# Patient Record
Sex: MALE | Race: WHITE | NOT HISPANIC OR LATINO | Employment: FULL TIME | URBAN - METROPOLITAN AREA
[De-identification: names, ages, dates, MRNs, and addresses within clinical notes are randomized per-mention and may not be internally consistent; named-entity substitution may affect disease eponyms.]

---

## 2018-10-10 PROBLEM — S91.359A: Status: ACTIVE | Noted: 2018-10-10

## 2020-06-30 ENCOUNTER — HOSPITAL ENCOUNTER (EMERGENCY)
Facility: HOSPITAL | Age: 30
Discharge: HOME | End: 2020-06-30
Attending: EMERGENCY MEDICINE
Payer: COMMERCIAL

## 2020-06-30 VITALS
WEIGHT: 165 LBS | RESPIRATION RATE: 18 BRPM | DIASTOLIC BLOOD PRESSURE: 91 MMHG | HEIGHT: 70 IN | BODY MASS INDEX: 23.62 KG/M2 | HEART RATE: 89 BPM | SYSTOLIC BLOOD PRESSURE: 130 MMHG | OXYGEN SATURATION: 99 % | TEMPERATURE: 98 F

## 2020-06-30 DIAGNOSIS — S41.119A LACERATION OF ARM: ICD-10-CM

## 2020-06-30 DIAGNOSIS — T14.90XA INJURY: ICD-10-CM

## 2020-06-30 DIAGNOSIS — S51.812A LACERATION OF LEFT FOREARM, INITIAL ENCOUNTER: Primary | ICD-10-CM

## 2020-06-30 PROCEDURE — 99284 EMERGENCY DEPT VISIT MOD MDM: CPT | Mod: 25

## 2020-06-30 PROCEDURE — 12002 RPR S/N/AX/GEN/TRNK2.6-7.5CM: CPT

## 2020-06-30 PROCEDURE — 25000003 PHARM REV CODE 250: Performed by: EMERGENCY MEDICINE

## 2020-06-30 RX ORDER — HYDROCODONE BITARTRATE AND ACETAMINOPHEN 5; 325 MG/1; MG/1
1 TABLET ORAL EVERY 6 HOURS PRN
Qty: 6 TABLET | Refills: 0 | Status: SHIPPED | OUTPATIENT
Start: 2020-06-30 | End: 2023-11-29

## 2020-06-30 RX ORDER — CLINDAMYCIN HYDROCHLORIDE 150 MG/1
300 CAPSULE ORAL 4 TIMES DAILY
Qty: 40 CAPSULE | Refills: 0 | Status: SHIPPED | OUTPATIENT
Start: 2020-06-30 | End: 2020-07-05

## 2020-06-30 RX ORDER — LIDOCAINE HYDROCHLORIDE 10 MG/ML
10 INJECTION, SOLUTION EPIDURAL; INFILTRATION; INTRACAUDAL; PERINEURAL
Status: COMPLETED | OUTPATIENT
Start: 2020-06-30 | End: 2020-06-30

## 2020-06-30 RX ADMIN — LIDOCAINE HYDROCHLORIDE 100 MG: 10 INJECTION, SOLUTION EPIDURAL; INFILTRATION; INTRACAUDAL; PERINEURAL at 02:06

## 2020-06-30 NOTE — Clinical Note
Rafael Garcia was seen and treated in our emergency department on 6/30/2020.  He may return with limitations on 07/01/2020.  The patient cannot do any heavy lifting or operating machinery with his left hand until cleared by orthopedic surgeon     Sincerely,      Shirley Santana MD

## 2020-06-30 NOTE — PROVIDER PROGRESS NOTES - EMERGENCY DEPT.
Emergency Department TeleTRIAGE Encounter Note      CHIEF COMPLAINT    Chief Complaint   Patient presents with    Arm Injury     pt accidentally cut his left forearm with a  at work pta. has deep 4-5cm laceration. also has small abrasion to left upper leg. last tetanus was about 2 years ago       VITAL SIGNS   Initial Vitals [06/30/20 1326]   BP Pulse Resp Temp SpO2   (!) 130/91 89 18 98.2 °F (36.8 °C) 99 %      MAP       --            ALLERGIES    Review of patient's allergies indicates:   Allergen Reactions    Penicillins        PROVIDER TRIAGE NOTE  Patient with past medical history GERD presents to the ED for evaluation of laceration.  Patient was at work when he was using a  and he accidentally cut his left forearm.  Injury occurred just prior to arrival.  Bleeding is well controlled.  Patient states his last tetanus shot was approximately 2 years ago.  He is able to move his elbow, wrist, and all 5 digits.  He reports lots of tingling between his elbow and his fingers.      ORDERS  Labs Reviewed - No data to display    ED Orders (720h ago, onward)    Start Ordered     Status Ordering Provider    06/30/20 1343 06/30/20 1342  Diet NPO  Diet effective now      Ordered ESPERANZA, RICH G.    Unscheduled 06/30/20 1342  Vital Signs  Every 30 min      Ordered ESPERNAZARUTHY G.    Unscheduled 06/30/20 1342  Provide suture tray to patient bedside  Once      Ordered RUTH MATAY G.    Unscheduled 06/30/20 1342  Clean wound(s)  Daily     Comments: Wound care routine (specify peroxide, hibiclens, betadine, or normal saline): betadine and normal saline.    Ordered RICH MATA G.            Virtual Visit Note: The provider triage portion of this emergency department evaluation and documentation was performed via [employer], a HIPAA-compliant telemedicine application, in concert with a tele-presenter in the room. A face to face patient evaluation with one of my colleagues will occur once the patient is placed  in an emergency department room.      DISCLAIMER: This note was prepared with [employer] voice recognition transcription software. Garbled syntax, mangled pronouns, and other bizarre constructions may be attributed to that software system.

## 2020-06-30 NOTE — Clinical Note
Rafael Garcia was seen and treated in our emergency department on 6/30/2020.  He may return to work on 07/01/2020.       If you have any questions or concerns, please don't hesitate to call.      Shirley Santana MD

## 2020-06-30 NOTE — ED PROVIDER NOTES
Encounter Date: 6/30/2020    SCRIBE #1 NOTE: IMich, am scribing for, and in the presence of, Shirley Santana MD.       History     Chief Complaint   Patient presents with    Arm Injury     pt accidentally cut his left forearm with a  at work pta. has deep 4-5cm laceration. also has small abrasion to left upper leg. last tetanus was about 2 years ago     Rafael Garcia is a 29 y.o. male who  has a past medical history of GERD (gastroesophageal reflux disease).    The patient presents to the ED due to arm injury that occurred at work prior to arrival. Patient reports he accidentally cut his left forearm with a . His last tetanus was about 2 years ago.       The history is provided by the patient.     Review of patient's allergies indicates:   Allergen Reactions    Penicillins      Past Medical History:   Diagnosis Date    GERD (gastroesophageal reflux disease)      Past Surgical History:   Procedure Laterality Date    CHOLECYSTECTOMY      HERNIA REPAIR      TONSILLECTOMY       No family history on file.  Social History     Tobacco Use    Smoking status: Never Smoker    Smokeless tobacco: Current User     Types: Chew   Substance Use Topics    Alcohol use: Yes     Comment: occ     Drug use: Not on file     Review of Systems   Constitutional: Negative for fever.   HENT: Negative for sore throat.    Respiratory: Negative for shortness of breath.    Cardiovascular: Negative for chest pain.   Gastrointestinal: Negative for nausea.   Genitourinary: Negative for dysuria.   Musculoskeletal: Negative for back pain.   Skin: Positive for wound. Negative for rash.   Neurological: Negative for weakness.   Hematological: Does not bruise/bleed easily.   All other systems reviewed and are negative.      Physical Exam     Initial Vitals [06/30/20 1326]   BP Pulse Resp Temp SpO2   (!) 130/91 89 18 98.2 °F (36.8 °C) 99 %      MAP       --         Physical Exam    Nursing note and vitals  reviewed.  Constitutional: He appears well-developed and well-nourished.   HENT:   Head: Normocephalic and atraumatic.   Eyes: EOM are normal. Pupils are equal, round, and reactive to light.   Neck: Normal range of motion. Neck supple.   Musculoskeletal:      Comments: There is a 7 cm laceration horizontal across the mid left flexor forearm.   Laceration extends into the muscle belly on the ulnar aspect of the wound, but on the radial aspect of the wound, it extends only to the subcutaneous fat layer.  Tendon and muscle sheaths are intact.  Some decreased sensation to the volar thenar eminence.  Hand with full ROM.    Neurological: He is alert and oriented to person, place, and time.   Skin: Skin is warm and dry.         ED Course   Lac Repair    Date/Time: 6/30/2020 4:13 PM  Performed by: Shirley Santana MD  Authorized by: Shirley Santana MD   Body area: upper extremity  Location details: left lower arm  Laceration length: 7 cm  Anesthesia: local infiltration    Anesthesia:  Local Anesthetic: lidocaine 1% without epinephrine  Anesthetic total: 10 mL  Preparation: Patient was prepped and draped in the usual sterile fashion.  Irrigation solution: chlorhexidine.  Irrigation method: jet lavage (high pressure)  Amount of cleaning: extensive  Debridement: extensive  Degree of undermining: extensive  Skin closure: Ethilon (3-0)  Number of sutures: 5  Patient tolerance: Patient tolerated the procedure well with no immediate complications  Comments: Wound irrigated copiously with high pressure irrigation. Dirt and debris removed from wound.  Sterile dressing applied.        Labs Reviewed - No data to display       Imaging Results          X-Ray Forearm Left (Final result)  Result time 06/30/20 14:41:44    Final result by Micah Alberts MD (06/30/20 14:41:44)                 Impression:      No fracture, dislocation or joint effusion.      Electronically signed by: Micah Alberts  MD  Date:    06/30/2020  Time:    14:41             Narrative:    EXAMINATION:  XR FOREARM LEFT    CLINICAL HISTORY:  Injury, unspecified, initial encounter    TECHNIQUE:  AP and lateral views of the left forearm were performed.    COMPARISON:  None    FINDINGS:  Bone joint soft tissues normal.                                 Medical Decision Making:   Clinical Tests:   Radiological Study: Reviewed and Ordered                   ED Course as of Jun 30 1620   Tue Jun 30, 2020   1619 Tetanus up-to-date.  Last tetanus2 years ago.    [ST]      ED Course User Index  [ST] Shirley Santana MD                Clinical Impression:       ICD-10-CM ICD-9-CM   1. Laceration of left forearm, initial encounter  S51.812A 881.00   2. Injury  T14.90XA 959.9   3. Laceration of arm  S41.119A 884.0           Disposition:   Disposition: Discharged  Condition: Stable     ED Disposition Condition    Discharge Stable        ED Prescriptions     Medication Sig Dispense Start Date End Date Auth. Provider    clindamycin (CLEOCIN) 150 MG capsule Take 2 capsules (300 mg total) by mouth 4 (four) times daily. for 5 days 40 capsule 6/30/2020 7/5/2020 Shirley Santana MD    HYDROcodone-acetaminophen (NORCO) 5-325 mg per tablet Take 1 tablet by mouth every 6 (six) hours as needed for Pain. 6 tablet 6/30/2020  Shirley Santana MD        Follow-up Information     Follow up With Specialties Details Why Contact Info    Tripp Granados Jr., MD Hand Surgery, Orthopedic Surgery In 1 week  [address] [facility]  [address]  [facility] [address]  [phone]                            I, Shirley Santana, personally performed the services described in this documentation. All medical record entries made by the scribe were at my direction and in my presence.  I have reviewed the chart and agree that the record reflects my personal performance and is accurate and complete. Shirley Santana M.D. 4:20 PM06/30/2020         Shirley Santana MD  06/30/20 0680

## 2020-06-30 NOTE — ED NOTES
MD closed laceration with sutures, patient tolerated procedure well. Dressing applied to site and secured with coban

## 2020-06-30 NOTE — ED NOTES
Patient placed in room and gauze removed. Patient suffered deep laceration to left forearm today while at work.Patient stated the  got stuck and went across his arm and cut through his pant leg. Patient has deep laceration. Site re-wrapped with sterile dressing and awaiting MD evaluation. Patient is able to move all digits to left hand, capillary refill to left hand less than 3 sec. MD notified of patient laceration. Laceration repair tray at bedside. Patient  Work supervisor, Rafael Velásquez ([phone]) is requesting drug screen and authorized payment. [employer] contacted at [phone] and will contact Rafael Velásquez for payment.  Patient authorized screening.

## 2021-05-10 ENCOUNTER — OCCUPATIONAL HEALTH (OUTPATIENT)
Dept: URGENT CARE | Facility: CLINIC | Age: 31
End: 2021-05-10

## 2021-05-10 DIAGNOSIS — Z11.59 SCREENING FOR VIRAL DISEASE: Primary | ICD-10-CM

## 2021-05-10 LAB
CTP QC/QA: YES
SARS-COV-2 RDRP RESP QL NAA+PROBE: NEGATIVE

## 2021-05-10 PROCEDURE — U0002 COVID-19 LAB TEST NON-CDC: HCPCS | Mod: QW,S$GLB,, | Performed by: NURSE PRACTITIONER

## 2021-05-10 PROCEDURE — U0002: ICD-10-PCS | Mod: QW,S$GLB,, | Performed by: NURSE PRACTITIONER

## 2023-09-21 ENCOUNTER — OFFICE VISIT (OUTPATIENT)
Dept: URGENT CARE | Facility: CLINIC | Age: 33
End: 2023-09-21
Payer: COMMERCIAL

## 2023-09-21 VITALS
HEIGHT: 70 IN | HEART RATE: 87 BPM | RESPIRATION RATE: 16 BRPM | TEMPERATURE: 99 F | SYSTOLIC BLOOD PRESSURE: 134 MMHG | OXYGEN SATURATION: 98 % | BODY MASS INDEX: 24.34 KG/M2 | WEIGHT: 170 LBS | DIASTOLIC BLOOD PRESSURE: 90 MMHG

## 2023-09-21 DIAGNOSIS — S61.211A LACERATION OF LEFT INDEX FINGER WITHOUT FOREIGN BODY WITHOUT DAMAGE TO NAIL, INITIAL ENCOUNTER: Primary | ICD-10-CM

## 2023-09-21 DIAGNOSIS — J01.40 ACUTE NON-RECURRENT PANSINUSITIS: ICD-10-CM

## 2023-09-21 PROCEDURE — 73140 XR FINGER 2 OR MORE VIEWS LEFT: ICD-10-PCS | Mod: LT,S$GLB,, | Performed by: RADIOLOGY

## 2023-09-21 PROCEDURE — 12001 LACERATION REPAIR: ICD-10-PCS | Mod: S$GLB,,,

## 2023-09-21 PROCEDURE — 99204 PR OFFICE/OUTPT VISIT, NEW, LEVL IV, 45-59 MIN: ICD-10-PCS | Mod: 25,S$GLB,,

## 2023-09-21 PROCEDURE — 73140 X-RAY EXAM OF FINGER(S): CPT | Mod: LT,S$GLB,, | Performed by: RADIOLOGY

## 2023-09-21 PROCEDURE — 12001 RPR S/N/AX/GEN/TRNK 2.5CM/<: CPT | Mod: S$GLB,,,

## 2023-09-21 PROCEDURE — 99204 OFFICE O/P NEW MOD 45 MIN: CPT | Mod: 25,S$GLB,,

## 2023-09-21 RX ORDER — MUPIROCIN 20 MG/G
OINTMENT TOPICAL 3 TIMES DAILY
Qty: 15 G | Refills: 0 | Status: SHIPPED | OUTPATIENT
Start: 2023-09-21 | End: 2023-09-28

## 2023-09-21 RX ORDER — AZITHROMYCIN 250 MG/1
TABLET, FILM COATED ORAL
Qty: 6 TABLET | Refills: 0 | Status: SHIPPED | OUTPATIENT
Start: 2023-09-21 | End: 2023-09-26

## 2023-09-21 RX ORDER — DEXTROMETHORPHAN HYDROBROMIDE, GUAIFENESIN, PHENYLEPHRINE HYDROCHLORIDE 17.5; 400; 1 MG/1; MG/1; MG/1
1 TABLET ORAL
Qty: 20 TABLET | Refills: 0 | Status: SHIPPED | OUTPATIENT
Start: 2023-09-21 | End: 2023-09-26

## 2023-09-21 RX ORDER — AZELASTINE 1 MG/ML
1 SPRAY, METERED NASAL 2 TIMES DAILY
Qty: 30 ML | Refills: 0 | Status: SHIPPED | OUTPATIENT
Start: 2023-09-21 | End: 2023-11-29

## 2023-09-21 NOTE — PROCEDURES
Laceration Repair    Date/Time: 2023 2:30 PM    Performed by: Christy Hamm PA-C  Authorized by: Christy Hamm PA-C  Consent Done: Yes  Consent: Verbal consent obtained.  Consent given by: patient  Patient understanding: patient states understanding of the procedure being performed  Patient consent: the patient's understanding of the procedure matches consent given  Procedure consent: procedure consent matches procedure scheduled  Imaging studies: imaging studies available  Patient identity confirmed:  and name  Body area: upper extremity  Location details: left index finger  Laceration length: 1.5 cm  Foreign bodies: no foreign bodies  Tendon involvement: none  Nerve involvement: none  Vascular damage: no  Anesthesia: local infiltration    Anesthesia:  Local Anesthetic: lidocaine 1% without epinephrine  Anesthetic total: 1 mL    Patient sedated: no  Preparation: Patient was prepped and draped in the usual sterile fashion.  Irrigation solution: saline  Irrigation method: syringe  Debridement: none  Degree of undermining: none  Skin closure: 5-0 Prolene  Number of sutures: 2  Technique: simple  Approximation: close  Approximation difficulty: simple  Dressing: antibiotic ointment, dressing applied and non-stick sterile dressing  Patient tolerance: Patient tolerated the procedure well with no immediate complications

## 2023-09-21 NOTE — PROGRESS NOTES
"Subjective:      Patient ID: Rafael Garcia is a 32 y.o. male.    Vitals:  height is 5' 10" (1.778 m) and weight is 77.1 kg (170 lb). His oral temperature is 99.4 °F (37.4 °C). His blood pressure is 134/90 (abnormal) and his pulse is 87. His respiration is 16 and oxygen saturation is 98%.     Chief Complaint: Laceration    PT presents today with laceration to left 2nd digit, he state's cut his finger on a jigsaw cutting pieces of wood x 2 hrs. Pt reports he put hydrogen peroxide and antibiotic ointment to area. Tetanus is UTD. Patient also reports sinus congestion and has been using Afrin and Flonase with no resolution.     Laceration   The incident occurred 3 to 6 hours ago. The laceration mechanism was a metal edge. The pain is at a severity of 2/10. The patient is experiencing no pain. He reports no foreign bodies present. His tetanus status is UTD.       Skin:  Positive for laceration. Negative for erythema.      Objective:     Physical Exam   Constitutional: He is oriented to person, place, and time. He appears well-developed.   HENT:   Head: Normocephalic and atraumatic. Head is without abrasion, without contusion and without laceration.   Ears:   Right Ear: External ear normal. A middle ear effusion is present.   Left Ear: External ear normal. A middle ear effusion is present.   Nose: Mucosal edema and congestion present.   Mouth/Throat: Oropharynx is clear and moist and mucous membranes are normal.   Eyes: Conjunctivae, EOM and lids are normal.   Neck: Trachea normal and phonation normal. Neck supple.   Pulmonary/Chest: Effort normal. No respiratory distress.   Musculoskeletal: Normal range of motion.         General: Normal range of motion.      Left hand: Left index finger: Exhibits decreased ROM (No swelling, bruising noted to area.). Injuries: laceration (Left laceration to radial aspect of finger near PIP joint. No foreign body present. Patient is able to flex and extend digit against " resistance. Cap refill <3sec. Radial pulse intact.).   Neurological: He is alert and oriented to person, place, and time.   Skin: Skin is warm, dry, intact and no rash. No abrasion, No burn, No bruising, No erythema and No ecchymosis   Psychiatric: His speech is normal and behavior is normal. Judgment and thought content normal.   Nursing note and vitals reviewed.  X-Ray Finger 2 or More Views Left    Result Date: 9/21/2023  EXAMINATION: XR FINGER 2 OR MORE VIEWS LEFT CLINICAL HISTORY: Laceration without foreign body of left index finger without damage to nail, initial encounter TECHNIQUE: Three views COMPARISON: Left forearm series 06/30/2020 FINDINGS: Bones are well mineralized. Overall alignment is within normal limits.  No displaced fracture, dislocation or destructive osseous process. Joint spaces appear relatively maintained. Slight skin irregularity/soft tissue defect along the radial aspect of the left 2nd finger at the level of the distal diaphysis/metaphysis likely representing soft tissue laceration type injury correlating with clinical history.  No subcutaneous emphysema or radiodense retained foreign body.     Distal left 2nd finger suspected soft tissue laceration type injury, without acute displaced fracture-dislocation or radiodense retained foreign body identified. Electronically signed by: Braeden Garcia MD Date:    09/21/2023 Time:    16:03         Assessment:     1. Laceration of left index finger without foreign body without damage to nail, initial encounter    2. Acute non-recurrent pansinusitis        Plan:       Laceration of left index finger without foreign body without damage to nail, initial encounter  -     Cancel: X-Ray Finger 2 or More Views Right; Future; Expected date: 09/21/2023  -     X-Ray Finger 2 or More Views Left; Future; Expected date: 09/21/2023  -     mupirocin (BACTROBAN) 2 % ointment; Apply topically 3 (three) times daily. for 7 days  Dispense: 15 g; Refill: 0    Acute  non-recurrent pansinusitis  -     azelastine (ASTELIN) 137 mcg (0.1 %) nasal spray; 1 spray (137 mcg total) by Nasal route 2 (two) times daily.  Dispense: 30 mL; Refill: 0  -     phenylephrine-DM-guaiFENesin (DECONEX DMX) 10-17.5-400 mg Tab; Take 1 tablet by mouth every 4 to 6 hours as needed (sinus pressure).  Dispense: 20 tablet; Refill: 0  -     azithromycin (Z-JOAQUINA) 250 MG tablet; Take 2 tablets by mouth on day 1; Take 1 tablet by mouth on days 2-5  Dispense: 6 tablet; Refill: 0      Reviewed left finger xray in PACS: no acute fracture or foreign body.   Return in 10 days for suture removal.   Tetanus is UTD.      WOUND CARE/ABRASIONS    Wash the wound(s) gently daily with warm water and mild soap.      Next, apply Bactroban/mupirocin (if prescribed) or Vaseline/petroleum jelly, or Aquaphor.  Avoid Neosporin or bacitracin, as this can cause allergic reactions, making the wound itchy, red, and bumpy.      After application of ointment, bandage the wound using a topical non-stick dressing. This can be purchased over the counter. The wound may have weeping of clear fluid (exudates) which seeps through the non-stick pad, so place a layer of dry, absorbent gauze over this to handle any exudates.    This type of dressing should be removed and re-applied twice daily initially, then reduced to once daily when the weeping of clear fluid decreases.    Do not let the wound get dry and crusted.  Keeping it moist with the Vaseline/petroleum will help it heal faster.    Watch for signs or symptoms of infection such as increasing pain at the site of your wound; redness spreading around the wound edges (although a thin, light rim of redness around the wound edges initially can be normal); white, thick or foul-smelling discharge from the wound base; or unexplained fevers.  If you suspect these symptoms or have other unexplained symptoms or concerns, seek out consultation with a qualified health care professional  immediately.    Once your wound is no longer oozing and raw, and shiny new pink healthy skin is visible, begin:  Gentle rubbing of the scar(s) with Vaseline/petroleum jelly to help the scar mature and flatten faster.  Silicone gel sheeting such as Curad Scar Therapy can be used if it is raised.  Leave this on for 24 hours per day (you can leave it off for an hour or so around bathing).  If you are not able to do this, then use it nightly.  Replace the pad with a new one when old one falls off.    As the wound heals, keep in mind:  In the first few weeks, you may not feel satisfied with the appearance of your wound.  Don't get too concerned.  You won't see the final appearance of your scar for 6 months to a year, and it is very likely to fade and improve with time.    In the meantime, strict sun protection is essential. The sun can permanently darken scars.  Wear sun protective clothing and sunscreen which is at least SPF 30, broad spectrum (including UVA and UVB) with either zinc or titanium or both as active ingredients.  A thick layer and frequent application when in the sun for prolonged periods of time is essential.  You are also better off avoiding peak sun hours altogether!    [email or web address]    Your Laceration Aftercare Instructions    A laceration, or cut, is an open wound through the skin. These can be due to many different causes and can come in many different forms. The depth, location, and cause of your wound, among multiple other factors (including your preference) plays a role in the treatment choices made today.  The main purposes of these treatments are to stop the bleeding, and to help the wound heal with reduced scarring.  Care at home depends on the type of wound and method used to close or treat the wound such as sutures, (stitches), staples, tape (steri-strips), or skin glue (Dermabond).   Keep the wound clean and dry for the first 24 hours.  The  wound has probably had a bandage applied (unless skin glue was used, or if the wound was in a hard-to-bandage area, like your hair).  You can remove the bandage after 12-24 hours at your convenience.      If your wound was sutured or stapled:  You can clean the area with a mild soap and water 2 times a day. Don't use hydrogen peroxide, iodine-based solutions, or alcohol, which can slow healing, and will probably be painful!  Cover the wound with a thin layer of Bactroban/mupirocin.  Avoid Neosporin or bacitracin, as this can cause allergic reactions, making the wound itchy, red, and bumpy.  You can continue to apply the antibiotic twice daily until the wound scars and dries out, or the sutures/staples are removed.    Sutures and staples should be removed within 5 days to 2 weeks, depending on where on your body they are located.  One exception is if absorbable sutures were used-these sometimes don't require a visit for removal-I'll advise you if this was the case.      Optimal time frame for suture removal    Face     5 days  Scalp     7 days  Chest and arms   8-10  Back and Legs   10-14  Hands/fingers or Feet  10-14 days  High tension areas (joints)  10-14 days  Palms or soles   14-21 days    Keep in mind, specific situations related to your wound as well as your other medical conditions play into the optimal removal time-this is just a guideline!    If your skin was glued:  Skin adhesive glues such as Dermabond are sometimes used instead of sutures/stitches to close cuts. When the adhesive dries, it forms a film that holds the edges of the cut together. Skin adhesives are sometimes called liquid stitches.  Typically, bandages are not placed over a wound closed with tissue adhesive glue-you can think of the glue as a dressing. Avoid lotions, ointments, etc.  This is because creams and ointments can cause the glue to prematurely slough off.  You may have some swelling, color changes, and bloody crusting on or  around the wound for 2 or 3 days. This can be normal and doesn't mean the glue isn't working.  The glue will naturally slough off in about 5-7 days. At this time, scar tissue will be forming under the surface of the wound and your body will do the rest of the work of healing.  Some other important points are:  Do not scratch, rub, or pick at the adhesive.  Do not put tape directly over the adhesive.  You can shower with a skin adhesive in place, but do not soak the area in water. Do not go swimming. Be sure to gently dry the area after it gets wet.    If your hair strands were glued (hair apposition):  Sometimes, your own hair strands are used by twisting and gluing the strands of hair together, bringing the edges of a scalp laceration together.  The advantage of this is that you won't have to come back to have stitches or staples removed.  Please try to keep the area clean and dry and avoid shampoos or hair products on the area which can cause the glue to come loose prematurely.  The glue will naturally dissolve in about 5 days, after which time your hair strands will unravel.    If your wound was closed with tape:  Tape strips have the advantage of being less painful to apply and lower risk of infection, but do require more caution on your part, as they are more fragile than other skin closure techniques.  It's important to  keep the tape clean and dry  avoid picking at the tape or rubbing the area  avoid soaking in water (showering is okay-bathing is not)  The tape strips will fall off on their own in about 5-7 days (if they don't, you can gently remove them or soak the wound in water at this time to loosen them).  At this time, scar tissue will be forming under the surface of the wound and your body will do the rest of the work of healing.    If your cut was left open:  Many cuts are often left open.  This can be for several reasons, but often is because  the risk of infection is too high to primarily close the  wound (many reasons for this)  the scar that is expected without closure is cosmetically acceptable to you  This is fine, and in many cases, advisable.      After your stitches/staples/glue/tape come off:  It's very important to recognize that the most vulnerable time for a wound are the days right after the closure material has been removed.  This is when a wound is most susceptible to dehiscence (opening up).  Thus, be careful to avoid activities that could put your wound under unnecessary and excessive tension forces.  Any wound that passes through the full thickness of the skin will cause a permanent scar.  This scar may be very prominent at first but tends to gradually improve over the course of about a year. Protect your healing wound from excessive sunlight, which can darken the final appearance of a scar.  If a year has passed, and you are still not satisfied with the appearance of the scar, you can contact a plastic surgeon to discuss scar revision.    When should you call for help?  Call your doctor immediately or seek medical care in an emergency department if:  Your wound is causing new pain, or the pain gets worse.  Some pain is normal with a wound, but do not ignore pain that is getting worse instead of better. You could have an infection.  The cut starts to bleed, and blood soaks through the bandage. Oozing small amounts of blood is normal.  The skin near the cut is cold or pale or changes color.  You have tingling, weakness, or numbness near the cut that we did not address during your visit.  You have trouble moving the area near the cut that we did not address during the visit.  You have increased pain, swelling, warmth, or redness around the cut, which could be a sign of infection.  There are red streaks leading from the cut, or there is pus draining from the cut.  You have a fever that you can't explain for another reason (like having a common cold).    What else do I need to know/do?  Being  treated in an urgent care is only one step in your treatment. Even if you feel better, you still need to go to all suggested follow-up appointments and take medicines exactly as directed. This will help you recover and help prevent future problems.    Follow-up care is a key part of your treatment and safety.  Be sure to make and go to all appointments, and call your doctor if things are not going as expected.  If you've been prescribed antibiotics, take them as directed.  Do not stop taking them just because you feel better. If you don't finish the course, you can breed resistant infections, which are harder to treat!    Take good care of your wound at home to help it heal quickly and reduce your chance of infection. While your wound is healing, avoid any activity that could cause your wound to reopen.  Use common sense when it comes to activities.  Also, avoid unnecessary bacteria exposure-for example, swimming in an ocean or hot tub, or wearing shoes or gloves over a wound for a long period of time (which promotes bacterial growth).

## 2023-09-21 NOTE — PATIENT INSTRUCTIONS
WOUND CARE/ABRASIONS    Wash the wound(s) gently daily with warm water and mild soap.      Next, apply Bactroban/mupirocin (if prescribed) or Vaseline/petroleum jelly, or Aquaphor.  Avoid Neosporin or bacitracin, as this can cause allergic reactions, making the wound itchy, red, and bumpy.      After application of ointment, bandage the wound using a topical non-stick dressing. This can be purchased over the counter. The wound may have weeping of clear fluid (exudates) which seeps through the non-stick pad, so place a layer of dry, absorbent gauze over this to handle any exudates.    This type of dressing should be removed and re-applied twice daily initially, then reduced to once daily when the weeping of clear fluid decreases.    Do not let the wound get dry and crusted.  Keeping it moist with the Vaseline/petroleum will help it heal faster.    Watch for signs or symptoms of infection such as increasing pain at the site of your wound; redness spreading around the wound edges (although a thin, light rim of redness around the wound edges initially can be normal); white, thick or foul-smelling discharge from the wound base; or unexplained fevers.  If you suspect these symptoms or have other unexplained symptoms or concerns, seek out consultation with a qualified health care professional immediately.    Once your wound is no longer oozing and raw, and shiny new pink healthy skin is visible, begin:  Gentle rubbing of the scar(s) with Vaseline/petroleum jelly to help the scar mature and flatten faster.  Silicone gel sheeting such as Curad Scar Therapy can be used if it is raised.  Leave this on for 24 hours per day (you can leave it off for an hour or so around bathing).  If you are not able to do this, then use it nightly.  Replace the pad with a new one when old one falls off.    As the wound heals, keep in mind:  In the first few weeks, you may not feel satisfied with the appearance of your wound.  Don't get too  concerned.  You won't see the final appearance of your scar for 6 months to a year, and it is very likely to fade and improve with time.    In the meantime, strict sun protection is essential. The sun can permanently darken scars.  Wear sun protective clothing and sunscreen which is at least SPF 30, broad spectrum (including UVA and UVB) with either zinc or titanium or both as active ingredients.  A thick layer and frequent application when in the sun for prolonged periods of time is essential.  You are also better off avoiding peak sun hours altogether!    [email or web address]    Your Laceration Aftercare Instructions    A laceration, or cut, is an open wound through the skin. These can be due to many different causes and can come in many different forms. The depth, location, and cause of your wound, among multiple other factors (including your preference) plays a role in the treatment choices made today.  The main purposes of these treatments are to stop the bleeding, and to help the wound heal with reduced scarring.  Care at home depends on the type of wound and method used to close or treat the wound such as sutures, (stitches), staples, tape (steri-strips), or skin glue (Dermabond).   Keep the wound clean and dry for the first 24 hours.  The wound has probably had a bandage applied (unless skin glue was used, or if the wound was in a hard-to-bandage area, like your hair).  You can remove the bandage after 12-24 hours at your convenience.      If your wound was sutured or stapled:  You can clean the area with a mild soap and water 2 times a day. Don't use hydrogen peroxide, iodine-based solutions, or alcohol, which can slow healing, and will probably be painful!  Cover the wound with a thin layer of Bactroban/mupirocin.  Avoid Neosporin or bacitracin, as this can cause allergic reactions, making the wound itchy, red, and bumpy.  You can continue to apply the antibiotic  twice daily until the wound scars and dries out, or the sutures/staples are removed.    Sutures and staples should be removed within 5 days to 2 weeks, depending on where on your body they are located.  One exception is if absorbable sutures were used-these sometimes don't require a visit for removal-I'll advise you if this was the case.      Optimal time frame for suture removal    Face     5 days  Scalp     7 days  Chest and arms   8-10  Back and Legs   10-14  Hands/fingers or Feet  10-14 days  High tension areas (joints)  10-14 days  Palms or soles   14-21 days    Keep in mind, specific situations related to your wound as well as your other medical conditions play into the optimal removal time-this is just a guideline!    If your skin was glued:  Skin adhesive glues such as Dermabond are sometimes used instead of sutures/stitches to close cuts. When the adhesive dries, it forms a film that holds the edges of the cut together. Skin adhesives are sometimes called liquid stitches.  Typically, bandages are not placed over a wound closed with tissue adhesive glue-you can think of the glue as a dressing. Avoid lotions, ointments, etc.  This is because creams and ointments can cause the glue to prematurely slough off.  You may have some swelling, color changes, and bloody crusting on or around the wound for 2 or 3 days. This can be normal and doesn't mean the glue isn't working.  The glue will naturally slough off in about 5-7 days. At this time, scar tissue will be forming under the surface of the wound and your body will do the rest of the work of healing.  Some other important points are:  Do not scratch, rub, or pick at the adhesive.  Do not put tape directly over the adhesive.  You can shower with a skin adhesive in place, but do not soak the area in water. Do not go swimming. Be sure to gently dry the area after it gets wet.    If your hair strands were glued (hair apposition):  Sometimes, your own hair strands  are used by twisting and gluing the strands of hair together, bringing the edges of a scalp laceration together.  The advantage of this is that you won't have to come back to have stitches or staples removed.  Please try to keep the area clean and dry and avoid shampoos or hair products on the area which can cause the glue to come loose prematurely.  The glue will naturally dissolve in about 5 days, after which time your hair strands will unravel.    If your wound was closed with tape:  Tape strips have the advantage of being less painful to apply and lower risk of infection, but do require more caution on your part, as they are more fragile than other skin closure techniques.  It's important to  keep the tape clean and dry  avoid picking at the tape or rubbing the area  avoid soaking in water (showering is okay-bathing is not)  The tape strips will fall off on their own in about 5-7 days (if they don't, you can gently remove them or soak the wound in water at this time to loosen them).  At this time, scar tissue will be forming under the surface of the wound and your body will do the rest of the work of healing.    If your cut was left open:  Many cuts are often left open.  This can be for several reasons, but often is because  the risk of infection is too high to primarily close the wound (many reasons for this)  the scar that is expected without closure is cosmetically acceptable to you  This is fine, and in many cases, advisable.      After your stitches/staples/glue/tape come off:  It's very important to recognize that the most vulnerable time for a wound are the days right after the closure material has been removed.  This is when a wound is most susceptible to dehiscence (opening up).  Thus, be careful to avoid activities that could put your wound under unnecessary and excessive tension forces.  Any wound that passes through the full thickness of the skin will cause a permanent scar.  This scar may be very  prominent at first but tends to gradually improve over the course of about a year. Protect your healing wound from excessive sunlight, which can darken the final appearance of a scar.  If a year has passed, and you are still not satisfied with the appearance of the scar, you can contact a plastic surgeon to discuss scar revision.    When should you call for help?  Call your doctor immediately or seek medical care in an emergency department if:  Your wound is causing new pain, or the pain gets worse.  Some pain is normal with a wound, but do not ignore pain that is getting worse instead of better. You could have an infection.  The cut starts to bleed, and blood soaks through the bandage. Oozing small amounts of blood is normal.  The skin near the cut is cold or pale or changes color.  You have tingling, weakness, or numbness near the cut that we did not address during your visit.  You have trouble moving the area near the cut that we did not address during the visit.  You have increased pain, swelling, warmth, or redness around the cut, which could be a sign of infection.  There are red streaks leading from the cut, or there is pus draining from the cut.  You have a fever that you can't explain for another reason (like having a common cold).    What else do I need to know/do?  Being treated in an urgent care is only one step in your treatment. Even if you feel better, you still need to go to all suggested follow-up appointments and take medicines exactly as directed. This will help you recover and help prevent future problems.    Follow-up care is a key part of your treatment and safety.  Be sure to make and go to all appointments, and call your doctor if things are not going as expected.  If you've been prescribed antibiotics, take them as directed.  Do not stop taking them just because you feel better. If you don't finish the course, you can breed resistant infections, which are harder to treat!    Take good care  of your wound at home to help it heal quickly and reduce your chance of infection. While your wound is healing, avoid any activity that could cause your wound to reopen.  Use common sense when it comes to activities.  Also, avoid unnecessary bacteria exposure-for example, swimming in an ocean or hot tub, or wearing shoes or gloves over a wound for a long period of time (which promotes bacterial growth).

## 2023-10-02 ENCOUNTER — OFFICE VISIT (OUTPATIENT)
Dept: URGENT CARE | Facility: CLINIC | Age: 33
End: 2023-10-02
Payer: COMMERCIAL

## 2023-10-02 VITALS
WEIGHT: 165 LBS | BODY MASS INDEX: 23.62 KG/M2 | TEMPERATURE: 98 F | RESPIRATION RATE: 16 BRPM | HEART RATE: 64 BPM | SYSTOLIC BLOOD PRESSURE: 120 MMHG | DIASTOLIC BLOOD PRESSURE: 82 MMHG | OXYGEN SATURATION: 96 % | HEIGHT: 70 IN

## 2023-10-02 DIAGNOSIS — Z51.89 VISIT FOR WOUND CHECK: Primary | ICD-10-CM

## 2023-10-02 PROCEDURE — 99213 OFFICE O/P EST LOW 20 MIN: CPT | Mod: S$GLB,,,

## 2023-10-02 PROCEDURE — 99213 PR OFFICE/OUTPT VISIT, EST, LEVL III, 20-29 MIN: ICD-10-PCS | Mod: S$GLB,,,

## 2023-10-02 NOTE — LETTER
October 2, 2023      [facility]  [address], [address]  [place] [address]  Phone: [phone]  Fax: [phone]       Patient: Rafael Garcia   YOB: 1990  Date of Visit: 10/02/2023    To Whom It May Concern:    Kristy Garcia  was at [facility] on 10/02/2023. The patient may return to work/school on 10/3/2023 with no restrictions. If you have any questions or concerns, or if I can be of further assistance, please do not hesitate to contact me.    Sincerely,    Christy Hamm PA-C

## 2023-10-02 NOTE — PROGRESS NOTES
"Subjective:      Patient ID: Rafael Garcia is a 32 y.o. male.    Vitals:  height is 5' 10" (1.778 m) and weight is 74.8 kg (165 lb). His oral temperature is 97.9 °F (36.6 °C). His blood pressure is 120/82 and his pulse is 64. His respiration is 16 and oxygen saturation is 96%.     Chief Complaint: Laceration    Patient states he is here for a recheck of his finger. He had a laceration to the left 2nd finger on 9/21/23. He reports taking the stitches out himself. Denies purulent drainage, redness, pain, limited AROM, fever, red streaks.     Laceration   The incident occurred more than 1 week ago. Pain location: finger. The laceration is 2 cm in size. The laceration mechanism was a blunt object. The pain is at a severity of 0/10. The patient is experiencing no pain. Possible foreign bodies include wood. His tetanus status is UTD.       Skin:  Positive for laceration. Negative for erythema.      Objective:     Physical Exam   Constitutional: He is oriented to person, place, and time. He appears well-developed.  Non-toxic appearance. He does not appear ill. No distress.   HENT:   Head: Normocephalic and atraumatic. Head is without abrasion, without contusion and without laceration.   Ears:   Right Ear: External ear normal.   Left Ear: External ear normal.   Nose: Nose normal.   Mouth/Throat: Oropharynx is clear and moist and mucous membranes are normal.   Eyes: Conjunctivae, EOM and lids are normal.   Neck: Trachea normal and phonation normal. Neck supple.   Pulmonary/Chest: Effort normal. No respiratory distress.   Musculoskeletal: Normal range of motion.         General: Normal range of motion.   Neurological: He is alert and oriented to person, place, and time.   Skin: Skin is warm, dry, intact, not diaphoretic and no rash. No abrasion, No burn, No bruising, No erythema and No ecchymosis         Comments: Dried, well-healed linear scab to left 2nd digit. No sutures in place. No drainage, erythema, streaking " from area. Full AROM of digit. Cap refill <2sec.    Psychiatric: His speech is normal and behavior is normal. Judgment and thought content normal.   Nursing note and vitals reviewed.      Assessment:     1. Visit for wound check        Plan:       Visit for wound check    Wound appears well healed. Keep area moist with either Bactroban or Aquafor to help healing. Discussed with patient the importance of f/u with their primary care provider. Urged to go to the ER for any worsening signs or symptoms.     WOUND CARE/ABRASIONS    Wash the wound(s) gently daily with warm water and mild soap.      Next, apply Bactroban/mupirocin (if prescribed) or Vaseline/petroleum jelly, or Aquaphor.  Avoid Neosporin or bacitracin, as this can cause allergic reactions, making the wound itchy, red, and bumpy.      After application of ointment, bandage the wound using a topical non-stick dressing. This can be purchased over the counter. The wound may have weeping of clear fluid (exudates) which seeps through the non-stick pad, so place a layer of dry, absorbent gauze over this to handle any exudates.    This type of dressing should be removed and re-applied twice daily initially, then reduced to once daily when the weeping of clear fluid decreases.    Do not let the wound get dry and crusted.  Keeping it moist with the Vaseline/petroleum will help it heal faster.    Watch for signs or symptoms of infection such as increasing pain at the site of your wound; redness spreading around the wound edges (although a thin, light rim of redness around the wound edges initially can be normal); white, thick or foul-smelling discharge from the wound base; or unexplained fevers.  If you suspect these symptoms or have other unexplained symptoms or concerns, seek out consultation with a qualified health care professional immediately.    Once your wound is no longer oozing and raw, and shiny new pink healthy skin is visible, begin:  Gentle rubbing of the  scar(s) with Vaseline/petroleum jelly to help the scar mature and flatten faster.  Silicone gel sheeting such as Curad Scar Therapy can be used if it is raised.  Leave this on for 24 hours per day (you can leave it off for an hour or so around bathing).  If you are not able to do this, then use it nightly.  Replace the pad with a new one when old one falls off.    As the wound heals, keep in mind:  In the first few weeks, you may not feel satisfied with the appearance of your wound.  Don't get too concerned.  You won't see the final appearance of your scar for 6 months to a year, and it is very likely to fade and improve with time.    In the meantime, strict sun protection is essential. The sun can permanently darken scars.  Wear sun protective clothing and sunscreen which is at least SPF 30, broad spectrum (including UVA and UVB) with either zinc or titanium or both as active ingredients.  A thick layer and frequent application when in the sun for prolonged periods of time is essential.  You are also better off avoiding peak sun hours altogether!      After your stitches/staples/glue/tape come off:  It's very important to recognize that the most vulnerable time for a wound are the days right after the closure material has been removed.  This is when a wound is most susceptible to dehiscence (opening up).  Thus, be careful to avoid activities that could put your wound under unnecessary and excessive tension forces.  Any wound that passes through the full thickness of the skin will cause a permanent scar.  This scar may be very prominent at first but tends to gradually improve over the course of about a year. Protect your healing wound from excessive sunlight, which can darken the final appearance of a scar.  If a year has passed, and you are still not satisfied with the appearance of the scar, you can contact a plastic surgeon to discuss scar revision.    When should you call for help?  Call your doctor immediately  or seek medical care in an emergency department if:  Your wound is causing new pain, or the pain gets worse.  Some pain is normal with a wound, but do not ignore pain that is getting worse instead of better. You could have an infection.  The cut starts to bleed, and blood soaks through the bandage. Oozing small amounts of blood is normal.  The skin near the cut is cold or pale or changes color.  You have tingling, weakness, or numbness near the cut that we did not address during your visit.  You have trouble moving the area near the cut that we did not address during the visit.  You have increased pain, swelling, warmth, or redness around the cut, which could be a sign of infection.  There are red streaks leading from the cut, or there is pus draining from the cut.  You have a fever that you can't explain for another reason (like having a common cold).    What else do I need to know/do?  Being treated in an urgent care is only one step in your treatment. Even if you feel better, you still need to go to all suggested follow-up appointments and take medicines exactly as directed. This will help you recover and help prevent future problems.    Follow-up care is a key part of your treatment and safety.  Be sure to make and go to all appointments, and call your doctor if things are not going as expected.  If you've been prescribed antibiotics, take them as directed.  Do not stop taking them just because you feel better. If you don't finish the course, you can breed resistant infections, which are harder to treat!    Take good care of your wound at home to help it heal quickly and reduce your chance of infection. While your wound is healing, avoid any activity that could cause your wound to reopen.  Use common sense when it comes to activities.  Also, avoid unnecessary bacteria exposure-for example, swimming in an ocean or hot tub, or wearing shoes or gloves over a wound for a long period of time (which promotes bacterial  growth).

## 2023-10-02 NOTE — PATIENT INSTRUCTIONS
You must understand that you have received treatment at an Urgent Care facility only, and that you may be  released before all of your medical problems are known or treated. Urgent Care facilities are not equipped to  handle life threatening emergencies. It is recommended that you seek care at an Emergency Department for  further evaluation of worsening or concerning symptoms, or possibly life threatening conditions as  discussed.    WOUND CARE/ABRASIONS    Wash the wound(s) gently daily with warm water and mild soap.      Next, apply Bactroban/mupirocin (if prescribed) or Vaseline/petroleum jelly, or Aquaphor.  Avoid Neosporin or bacitracin, as this can cause allergic reactions, making the wound itchy, red, and bumpy.      After application of ointment, bandage the wound using a topical non-stick dressing. This can be purchased over the counter. The wound may have weeping of clear fluid (exudates) which seeps through the non-stick pad, so place a layer of dry, absorbent gauze over this to handle any exudates.    This type of dressing should be removed and re-applied twice daily initially, then reduced to once daily when the weeping of clear fluid decreases.    Do not let the wound get dry and crusted.  Keeping it moist with the Vaseline/petroleum will help it heal faster.    Watch for signs or symptoms of infection such as increasing pain at the site of your wound; redness spreading around the wound edges (although a thin, light rim of redness around the wound edges initially can be normal); white, thick or foul-smelling discharge from the wound base; or unexplained fevers.  If you suspect these symptoms or have other unexplained symptoms or concerns, seek out consultation with a qualified health care professional immediately.    Once your wound is no longer oozing and raw, and shiny new pink healthy skin is visible, begin:  Gentle rubbing of the scar(s) with Vaseline/petroleum jelly to help the scar mature and  flatten faster.  Silicone gel sheeting such as Curad Scar Therapy can be used if it is raised.  Leave this on for 24 hours per day (you can leave it off for an hour or so around bathing).  If you are not able to do this, then use it nightly.  Replace the pad with a new one when old one falls off.    As the wound heals, keep in mind:  In the first few weeks, you may not feel satisfied with the appearance of your wound.  Don't get too concerned.  You won't see the final appearance of your scar for 6 months to a year, and it is very likely to fade and improve with time.    In the meantime, strict sun protection is essential. The sun can permanently darken scars.  Wear sun protective clothing and sunscreen which is at least SPF 30, broad spectrum (including UVA and UVB) with either zinc or titanium or both as active ingredients.  A thick layer and frequent application when in the sun for prolonged periods of time is essential.  You are also better off avoiding peak sun hours altogether!    [email or web address]    Your Laceration Aftercare Instructions    A laceration, or cut, is an open wound through the skin. These can be due to many different causes and can come in many different forms. The depth, location, and cause of your wound, among multiple other factors (including your preference) plays a role in the treatment choices made today.  The main purposes of these treatments are to stop the bleeding, and to help the wound heal with reduced scarring.  Care at home depends on the type of wound and method used to close or treat the wound such as sutures, (stitches), staples, tape (steri-strips), or skin glue (Dermabond).   Keep the wound clean and dry for the first 24 hours.  The wound has probably had a bandage applied (unless skin glue was used, or if the wound was in a hard-to-bandage area, like your hair).  You can remove the bandage after 12-24 hours at your convenience.       If your wound was sutured or stapled:  You can clean the area with a mild soap and water 2 times a day. Don't use hydrogen peroxide, iodine-based solutions, or alcohol, which can slow healing, and will probably be painful!  Cover the wound with a thin layer of Bactroban/mupirocin.  Avoid Neosporin or bacitracin, as this can cause allergic reactions, making the wound itchy, red, and bumpy.  You can continue to apply the antibiotic twice daily until the wound scars and dries out, or the sutures/staples are removed.    Sutures and staples should be removed within 5 days to 2 weeks, depending on where on your body they are located.  One exception is if absorbable sutures were used-these sometimes don't require a visit for removal-I'll advise you if this was the case.      Optimal time frame for suture removal    Face     5 days  Scalp     7 days  Chest and arms   8-10  Back and Legs   10-14  Hands/fingers or Feet  10-14 days  High tension areas (joints)  10-14 days  Palms or soles   14-21 days    Keep in mind, specific situations related to your wound as well as your other medical conditions play into the optimal removal time-this is just a guideline!    If your skin was glued:  Skin adhesive glues such as Dermabond are sometimes used instead of sutures/stitches to close cuts. When the adhesive dries, it forms a film that holds the edges of the cut together. Skin adhesives are sometimes called liquid stitches.  Typically, bandages are not placed over a wound closed with tissue adhesive glue-you can think of the glue as a dressing. Avoid lotions, ointments, etc.  This is because creams and ointments can cause the glue to prematurely slough off.  You may have some swelling, color changes, and bloody crusting on or around the wound for 2 or 3 days. This can be normal and doesn't mean the glue isn't working.  The glue will naturally slough off in about 5-7 days. At this time, scar tissue will be forming under the  surface of the wound and your body will do the rest of the work of healing.  Some other important points are:  Do not scratch, rub, or pick at the adhesive.  Do not put tape directly over the adhesive.  You can shower with a skin adhesive in place, but do not soak the area in water. Do not go swimming. Be sure to gently dry the area after it gets wet.    If your hair strands were glued (hair apposition):  Sometimes, your own hair strands are used by twisting and gluing the strands of hair together, bringing the edges of a scalp laceration together.  The advantage of this is that you won't have to come back to have stitches or staples removed.  Please try to keep the area clean and dry and avoid shampoos or hair products on the area which can cause the glue to come loose prematurely.  The glue will naturally dissolve in about 5 days, after which time your hair strands will unravel.    If your wound was closed with tape:  Tape strips have the advantage of being less painful to apply and lower risk of infection, but do require more caution on your part, as they are more fragile than other skin closure techniques.  It's important to  keep the tape clean and dry  avoid picking at the tape or rubbing the area  avoid soaking in water (showering is okay-bathing is not)  The tape strips will fall off on their own in about 5-7 days (if they don't, you can gently remove them or soak the wound in water at this time to loosen them).  At this time, scar tissue will be forming under the surface of the wound and your body will do the rest of the work of healing.    If your cut was left open:  Many cuts are often left open.  This can be for several reasons, but often is because  the risk of infection is too high to primarily close the wound (many reasons for this)  the scar that is expected without closure is cosmetically acceptable to you  This is fine, and in many cases, advisable.      After your stitches/staples/glue/tape come  off:  It's very important to recognize that the most vulnerable time for a wound are the days right after the closure material has been removed.  This is when a wound is most susceptible to dehiscence (opening up).  Thus, be careful to avoid activities that could put your wound under unnecessary and excessive tension forces.  Any wound that passes through the full thickness of the skin will cause a permanent scar.  This scar may be very prominent at first but tends to gradually improve over the course of about a year. Protect your healing wound from excessive sunlight, which can darken the final appearance of a scar.  If a year has passed, and you are still not satisfied with the appearance of the scar, you can contact a plastic surgeon to discuss scar revision.    When should you call for help?  Call your doctor immediately or seek medical care in an emergency department if:  Your wound is causing new pain, or the pain gets worse.  Some pain is normal with a wound, but do not ignore pain that is getting worse instead of better. You could have an infection.  The cut starts to bleed, and blood soaks through the bandage. Oozing small amounts of blood is normal.  The skin near the cut is cold or pale or changes color.  You have tingling, weakness, or numbness near the cut that we did not address during your visit.  You have trouble moving the area near the cut that we did not address during the visit.  You have increased pain, swelling, warmth, or redness around the cut, which could be a sign of infection.  There are red streaks leading from the cut, or there is pus draining from the cut.  You have a fever that you can't explain for another reason (like having a common cold).    What else do I need to know/do?  Being treated in an urgent care is only one step in your treatment. Even if you feel better, you still need to go to all suggested follow-up appointments and take medicines exactly as directed. This will help  you recover and help prevent future problems.    Follow-up care is a key part of your treatment and safety.  Be sure to make and go to all appointments, and call your doctor if things are not going as expected.  If you've been prescribed antibiotics, take them as directed.  Do not stop taking them just because you feel better. If you don't finish the course, you can breed resistant infections, which are harder to treat!    Take good care of your wound at home to help it heal quickly and reduce your chance of infection. While your wound is healing, avoid any activity that could cause your wound to reopen.  Use common sense when it comes to activities.  Also, avoid unnecessary bacteria exposure-for example, swimming in an ocean or hot tub, or wearing shoes or gloves over a wound for a long period of time (which promotes bacterial growth).

## 2023-11-29 ENCOUNTER — OFFICE VISIT (OUTPATIENT)
Dept: URGENT CARE | Facility: CLINIC | Age: 33
End: 2023-11-29
Payer: COMMERCIAL

## 2023-11-29 VITALS
HEART RATE: 97 BPM | RESPIRATION RATE: 17 BRPM | OXYGEN SATURATION: 97 % | BODY MASS INDEX: 23.62 KG/M2 | HEIGHT: 70 IN | TEMPERATURE: 100 F | SYSTOLIC BLOOD PRESSURE: 136 MMHG | DIASTOLIC BLOOD PRESSURE: 84 MMHG | WEIGHT: 165 LBS

## 2023-11-29 DIAGNOSIS — R50.9 FEVER IN ADULT: ICD-10-CM

## 2023-11-29 DIAGNOSIS — R68.89 FLU-LIKE SYMPTOMS: ICD-10-CM

## 2023-11-29 DIAGNOSIS — J11.1 INFLUENZA: Primary | ICD-10-CM

## 2023-11-29 LAB
CTP QC/QA: YES
SARS-COV-2 AG RESP QL IA.RAPID: NEGATIVE

## 2023-11-29 PROCEDURE — 99214 OFFICE O/P EST MOD 30 MIN: CPT | Mod: S$GLB,,, | Performed by: NURSE PRACTITIONER

## 2023-11-29 PROCEDURE — 87811 SARS CORONAVIRUS 2 ANTIGEN POCT, MANUAL READ: ICD-10-PCS | Mod: QW,S$GLB,, | Performed by: NURSE PRACTITIONER

## 2023-11-29 PROCEDURE — 99214 PR OFFICE/OUTPT VISIT, EST, LEVL IV, 30-39 MIN: ICD-10-PCS | Mod: S$GLB,,, | Performed by: NURSE PRACTITIONER

## 2023-11-29 PROCEDURE — 87811 SARS-COV-2 COVID19 W/OPTIC: CPT | Mod: QW,S$GLB,, | Performed by: NURSE PRACTITIONER

## 2023-11-29 RX ORDER — OSELTAMIVIR PHOSPHATE 75 MG/1
75 CAPSULE ORAL 2 TIMES DAILY
Qty: 10 CAPSULE | Refills: 0 | Status: SHIPPED | OUTPATIENT
Start: 2023-11-29 | End: 2023-12-04

## 2023-11-29 RX ORDER — PREDNISONE 20 MG/1
TABLET ORAL
Qty: 7 TABLET | Refills: 0 | Status: SHIPPED | OUTPATIENT
Start: 2023-11-29 | End: 2023-12-05

## 2023-11-29 NOTE — PATIENT INSTRUCTIONS
Flu, Adult Urgent Care   General Information   You came to Urgent Care for the flu. The flu, or influenza, is an infection that is caused by a virus. It is easy to spread from person to person. Most people get over the flu without any long-term problems. However, some people are more likely to get very sick from the flu.  You may need antiviral medicine to treat the flu. If so, it is important to take all of the medicine, even if you start to feel better. Antibiotics do not work on the flu.  What care is needed at home?   Call your regular doctor to let them know you were at Urgent Care. Make a follow-up appointment if you were told to.  Drink lots of water, juice, or broth to replace fluids lost in runny nose and fever.  Take warm, steamy showers to help soothe the cough.  Use hard candy or cough drops to soothe sore throat and cough.  Wash your hands often. This will help keep others healthy.  Try to thin mucus.  Drink lots of liquids.  Use a cool mist humidifier to avoid dry air.  Use saline nose drops to relieve stuffiness.  You may want to take drugs like ibuprofen, naproxen, or acetaminophen to help with fever and body aches.  When do I need to get emergency help?   Call for an ambulance right away if:   You are having so much trouble breathing that you can only say one or two words at a time.  You need to sit upright at all times to be able to breathe and or cannot lie down.  You are very tired from working to catch your breath or you are sweating from trying to breathe.  Go to the ED if:   You have trouble breathing when talking or sitting still.  You have severe chest discomfort.  You feel confused or disoriented.  When do I need to call the doctor?   You are throwing up and cant keep liquids down.  You develop early signs of fluid loss, such as:  Dark-colored urine.  Dry mouth.  Muscle cramps.  Lack of energy.  Feeling lightheaded when you get up.  You have new or worsening symptoms.  Last Reviewed Date    2020-09-24  Consumer Information Use and Disclaimer   This information is not specific medical advice and does not replace information you receive from your health care provider. This is only a brief summary of general information. It does NOT include all information about conditions, illnesses, injuries, tests, procedures, treatments, therapies, discharge instructions or life-style choices that may apply to you. You must talk with your health care provider for complete information about your health and treatment options. This information should not be used to decide whether or not to accept your health care providers advice, instructions or recommendations. Only your health care provider has the knowledge and training to provide advice that is right for you.  Copyright   Copyright © 2021 [employer] and its affiliates and/or licensors. All rights reserved.     The following are suggestions to help you with upper respiratory symptoms.    Congestion:    Nasal Saline  Nasal saline is available over the counter. There are several different commercial preparations such as Ocean spray and Ayr spray. There is no limit on the use of Nasal saline. Saline is used by snorting the mist up into the nose then later gently blowing the nose to get rid of any secretions that it has loosened.     Mucous Thinners and Decongestants  Mucous thinners and decongestants are used to shrink down the tissues and promote sinus drainage. There are multiple prescription and over-the-counter medications available. A mucous thinner will tend to be drying unless you are also drinking plenty of water when taking these. If you have high blood pressure, it is very important to monitor your pressure while on decongestants. The mucous thinner/decongestant combinations are typically given twice per day. However, some people will be unable to tolerate these at night and should only take them once per day.    Decongestant Nasal  Sprays  Over-the-counter decongestant nasal spray such as Afrin, may be helpful as an initial step in treating upper respiratory infections. This spray can be used for up to approximately 3 days and is used no more than twice per day. Topical nasal decongestant spray for longer than 5 days will result in a physical addiction, in which the nasal lining will become significantly swollen and irritated until the spray is used again. May cause elevated blood pressure    Use pseudoephedrine (behind the counter)  for sinus pressure and congestion- Pseudoephedrine 30 mg up to 240 mg /day. Common brands include Sudafed, Zephrex-D Wal-phed.  Warning: It can raise your blood pressure and give you palpitations, avoid with history of high blood pressure, palpitations, and severe cardiac disease.  Coricidin HBP is okay to use if you have high blood pressure.     Nasal Steroids  Nasal steroid medications such as Flonase are useful for upper respiratory infections, allergies, and sensitivities to airborne irritants. Unfortunately, they do not begin to work for 1-2 days, and they do not reach their maximum benefit for approximately 2-3 weeks. Initial therapy is typically 2 puffs per nostril twice per day. This should be used for only a few days, then the maintenance dosage is one or two puffs per nostril once per day. This can be done at any time of the day. The most effective way to use any nasal medication is to look down at your toes when spraying it in. Aim slightly away from the septum (dividing plate between the nostrils), and gently inhale. This ensures that the spray will go into the sinus cavities and not straight up into the nose. A good way to avoid spraying onto the septum is to use the right hand to spray into the left nostril, and vice versa for the right nostril. Occasionally, nasal steroids can increase the risk of nosebleeds, but in general they are very well tolerated. If you develop a bloody nose, stop using the  medication immediately.    Clear Runny Nose/Allergic Rhinitis:  Use an antihistamine to help dry you out.   Antihistamines  Antihistamines are available both over-the-counter and as a prescription. There are also various decongestant and antihistamine combinations available such as Claritin, Allegra, and Zyrtec. It is best to take any antihistamine-decongestant combination in the morning to avoid insomnia. Zyrtec should probably be taken at night, in order to reduce the chance of sleepiness during the daytime. If there is a significant infection present and secretions are already thickened, it is recommended to discontinue antihistamines and use a mucous thinner/decongestant combination.      Oral Steroids  Oral steroids can be used with more sever infections. Often, they are the only medications that will reduce the symptoms of pressure and allow the nasal sinuses to drain. These are best taken on a full stomach and earlier in the day is better. They may give you some irritability, stomach upset, or hyperactivity. This can also interfere with sleep.     A person who has high blood pressure or diabetes should be very careful to monitor their blood pressure or blood glucose while taking steroids. Steroids can have multiple side effects especially when taken long-term. Short-term doses are usually very well tolerated and extremely effective in controlling the symptoms associated with acute and chronic sinus infections and severe allergies. The use of steroids for greater than approximately seven days requires a tapering down in order to discontinue them. You should not abruptly stop your steroid if you have been taking the same dose for greater than one week.    Antibiotic Treatment  Finally, when all of these other measures have failed, and a bacterial infection is present, an antibiotic will be prescribed. The most common symptoms of acute sinusitis of a bacterial nature are pain, pressure, and thick and colored  nasal drainage. However, not all colored drainage means that there is a bacterial infection present. According to the Center for Disease Control, only 2% of colds will progress to result in bacterial sinusitis. Most upper respiratory infections should NOT be treated with antibiotics. Antibiotics should be reserved for upper respiratory infections which last longer than 10 days, or which worsen after 5 to 7 days of treatment. The use of antibiotics for nonbacterial upper respiratory infections has resulted in a severe problem with the emergence of bacteria which are resistant to multiple forms of antibiotics, and some bacteria are currently only treatable with intravenous antibiotics.    Body Aches/Pains/Fever  For patients who are not allergic to and are not on anticoagulants, you can alternate Tylenol every 4 hours and Motrin every 6 hours for fever above 100.4F and/or pain.  For patients who are allergic or intolerant to NSAIDS, have gastritis, gastric ulcers, or history of GI bleeds, are pregnant, or are on anticoagulant therapy, you can take Tylenol every 4 hours as needed for fever above 100.4F and/or pain.     Maintain adequate hydration -  Rest and keep yourself/patient well hydrated. For adults, it is recommended to drink at least 8 glasses of water daily.  This may help thin secretions and soothe the respiratory mucosa.     Sore Throat  Perform warm, salt water gargles (1/2 tsp salt to 1 cup warm water) to help reduce inflammation and throat discomfort. Chloraseptic sprays and throat lozenges will also help with your throat pain.    COUGH  A viral cough may linger for 3 to 4 weeks but should steadily improve over time. Coughing is the body's natural way to clear mucus and help get rid of bacteria and viruses. Therefore, cough suppressants are usually not recommended.      Use mucinex (guaifenisin) up to 2400mg/day to help clear and break up/loosen mucus/congestion from the chest when you have a cold or flu.       Common cough suppressants include the ingredient dextromethorphan or DM, (such as Mucinex DM) available over-the-counter and can be used for cough to stop the tickle in the back of your throat.      ? Honey may be beneficial, especially on nocturnal cough 1 to 2 teaspoons can be taken straight or diluted in tea, juice or other liquid.    The antioxidants in honey are an important contributor to its decongestant properties. Generally speaking, darker honey contains more antioxidants. Buckwheat and avocado honey are particularly good choices. If these honeys are not available in your area, choose the darkest honey you can find.    Take all medications as directed. If you have been prescribed antibiotics, make sure to complete them.     If your condition fails to improve in a timely manner, you should receive another evaluation by your Primary Care Provider to discuss your concerns or return to urgent care for a recheck.      **You must understand that you have received Urgent Care treatment only and that you may be released before all of your medical problems are known or treated. You, the patient, are responsible to arrange for follow-up care as instructed. If your condition worsens at any time, you should report immediately to your nearest Emergency Department for further evaluation.

## 2023-11-29 NOTE — PROGRESS NOTES
"Subjective:      Patient ID: Rafael Garcia is a 32 y.o. male.    Vitals:  height is 5' 10" (1.778 m) and weight is 74.8 kg (165 lb). His oral temperature is 99.6 °F (37.6 °C). His blood pressure is 136/84 and his pulse is 97. His respiration is 17 and oxygen saturation is 97%.     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     Cough  This is a new problem. The current episode started yesterday. The problem has been gradually worsening. The problem occurs constantly. The cough is Non-productive. Associated symptoms include chest pain (Soreness from coughing), headaches and postnasal drip. Pertinent negatives include no fever. Treatments tried: alkaselzter, cough drops, dayquil. There is no history of asthma, bronchitis or environmental allergies.       Constitution: Negative for fever.   HENT:  Positive for postnasal drip.    Cardiovascular:  Positive for chest pain (Soreness from coughing).   Respiratory:  Positive for cough.    Allergic/Immunologic: Negative for environmental allergies.   Neurological:  Positive for headaches.      Objective:     Physical Exam   Constitutional: He is oriented to person, place, and time. He appears well-developed. He is cooperative.  Non-toxic appearance. He appears ill. No distress.   HENT:   Head: Normocephalic and atraumatic.   Ears:   Right Ear: Hearing, tympanic membrane, external ear and ear canal normal.   Left Ear: Hearing, tympanic membrane, external ear and ear canal normal.   Nose: Rhinorrhea and congestion present. No mucosal edema or nasal deformity. No epistaxis. Right sinus exhibits no maxillary sinus tenderness and no frontal sinus tenderness. Left sinus exhibits no maxillary sinus tenderness and no frontal sinus tenderness.   Mouth/Throat: Uvula is midline, oropharynx is clear and moist and mucous membranes are normal. No trismus in the jaw. Normal dentition. No uvula swelling. No oropharyngeal " exudate, posterior oropharyngeal edema or posterior oropharyngeal erythema.   Eyes: Conjunctivae and lids are normal. No scleral icterus.   Neck: Trachea normal and phonation normal. Neck supple. No edema present. No erythema present. No neck rigidity present.   Cardiovascular: Normal rate, regular rhythm, normal heart sounds and normal pulses.   Pulmonary/Chest: Effort normal and breath sounds normal. No respiratory distress. He has no decreased breath sounds. He has no rhonchi.   Abdominal: Normal appearance.   Musculoskeletal: Normal range of motion.         General: No deformity. Normal range of motion.   Neurological: He is alert and oriented to person, place, and time. He exhibits normal muscle tone. Coordination normal.   Skin: Skin is warm, dry, intact, not diaphoretic and not pale.   Psychiatric: His speech is normal and behavior is normal. Judgment and thought content normal.   Nursing note and vitals reviewed.      Assessment:     1. Influenza    2. Fever in adult    3. Flu-like symptoms      Results for orders placed or performed in visit on 11/29/23   SARS Coronavirus 2 Antigen, POCT Manual Read   Result Value Ref Range    SARS Coronavirus 2 Antigen Negative Negative     Acceptable Yes       Plan:       Influenza  -     oseltamivir (TAMIFLU) 75 MG capsule; Take 1 capsule (75 mg total) by mouth 2 (two) times daily. for 5 days  Dispense: 10 capsule; Refill: 0  -     predniSONE (DELTASONE) 20 MG tablet; Take 2 tablets (40 mg) by mouth x2 days, then 1 tablet (20 mg) by mouth x3 days  Dispense: 7 tablet; Refill: 0    Fever in adult  -     SARS Coronavirus 2 Antigen, POCT Manual Read    Flu-like symptoms  -     SARS Coronavirus 2 Antigen, POCT Manual Read  -     predniSONE (DELTASONE) 20 MG tablet; Take 2 tablets (40 mg) by mouth x2 days, then 1 tablet (20 mg) by mouth x3 days  Dispense: 7 tablet; Refill: 0

## 2023-11-29 NOTE — LETTER
November 29, 2023      [facility]  [address], [address]  [place] [address]  Phone: [phone]  Fax: [phone]       Patient: Rafael Garcia   YOB: 1990  Date of Visit: 11/29/2023    To Whom It May Concern:    Kristy Garcia  was at [facility] on 11/29/2023. The patient may return to work/school on 12/3/2023 with no restrictions if symptoms have improved and no fever for 24 hours without the use of fever reducing medications such as Tylenol or ibuprofen. If you have any questions or concerns, or if I can be of further assistance, please do not hesitate to contact me.    Sincerely,     Kimberly Deluna NP

## 2023-12-05 ENCOUNTER — OFFICE VISIT (OUTPATIENT)
Dept: URGENT CARE | Facility: CLINIC | Age: 33
End: 2023-12-05
Payer: COMMERCIAL

## 2023-12-05 VITALS
OXYGEN SATURATION: 98 % | BODY MASS INDEX: 23.62 KG/M2 | RESPIRATION RATE: 19 BRPM | TEMPERATURE: 98 F | WEIGHT: 165 LBS | HEIGHT: 70 IN | SYSTOLIC BLOOD PRESSURE: 129 MMHG | HEART RATE: 66 BPM | DIASTOLIC BLOOD PRESSURE: 70 MMHG

## 2023-12-05 DIAGNOSIS — J20.9 ACUTE BRONCHITIS, UNSPECIFIED ORGANISM: Primary | ICD-10-CM

## 2023-12-05 DIAGNOSIS — H66.003 NON-RECURRENT ACUTE SUPPURATIVE OTITIS MEDIA OF BOTH EARS WITHOUT SPONTANEOUS RUPTURE OF TYMPANIC MEMBRANES: ICD-10-CM

## 2023-12-05 PROCEDURE — 99213 PR OFFICE/OUTPT VISIT, EST, LEVL III, 20-29 MIN: ICD-10-PCS | Mod: 25,S$GLB,,

## 2023-12-05 PROCEDURE — 99213 OFFICE O/P EST LOW 20 MIN: CPT | Mod: 25,S$GLB,,

## 2023-12-05 PROCEDURE — 96372 THER/PROPH/DIAG INJ SC/IM: CPT | Mod: S$GLB,,,

## 2023-12-05 PROCEDURE — 96372 PR INJECTION,THERAP/PROPH/DIAG2ST, IM OR SUBCUT: ICD-10-PCS | Mod: S$GLB,,,

## 2023-12-05 RX ORDER — PROMETHAZINE HYDROCHLORIDE AND DEXTROMETHORPHAN HYDROBROMIDE 6.25; 15 MG/5ML; MG/5ML
5 SYRUP ORAL EVERY 4 HOURS PRN
Qty: 180 ML | Refills: 0 | Status: SHIPPED | OUTPATIENT
Start: 2023-12-05 | End: 2023-12-15

## 2023-12-05 RX ORDER — AZITHROMYCIN 500 MG/1
500 TABLET, FILM COATED ORAL DAILY
Qty: 5 TABLET | Refills: 0 | Status: SHIPPED | OUTPATIENT
Start: 2023-12-05 | End: 2023-12-10

## 2023-12-05 RX ORDER — DEXAMETHASONE SODIUM PHOSPHATE 100 MG/10ML
8 INJECTION INTRAMUSCULAR; INTRAVENOUS
Status: COMPLETED | OUTPATIENT
Start: 2023-12-05 | End: 2023-12-05

## 2023-12-05 RX ADMIN — DEXAMETHASONE SODIUM PHOSPHATE 8 MG: 100 INJECTION INTRAMUSCULAR; INTRAVENOUS at 10:12

## 2023-12-05 NOTE — PATIENT INSTRUCTIONS
You must understand that you have received treatment at an Urgent Care facility only, and that you may be  released before all of your medical problems are known or treated. Urgent Care facilities are not equipped to  handle life threatening emergencies. It is recommended that you seek care at an Emergency Department for  further evaluation of worsening or concerning symptoms, or possibly life threatening conditions as  discussed.    Please drink plenty of fluids.  Please get plenty of rest.  Please return here or go to the Emergency Department for any concerns or worsening of condition.  If you were prescribed antibiotics, please take them to completion.  If you do not have Hypertension or any history of palpitations, it is ok to take over the counter Sudafed or Mucinex D or Allegra-D or Claritin-D or Zyrtec-D.  If you do take one of the above, it is ok to combine that with plain over the counter Mucinex or Allegra or Claritin or Zyrtec.  If for example you are taking Zyrtec -D, you can combine that with Mucinex, but not Mucinex-D.  If you are taking Mucinex-D, you can combine that with plain Allegra or Claritin or Zyrtec.   If you do have Hypertension or palpitations, it is safe to take Coricidin HBP for relief of sinus symptoms.  If not allergic, please take over the counter Tylenol (Acetaminophen) and/or Motrin (Ibuprofen) as directed for control of pain and/or fever.  Please follow up with your primary care doctor or specialist as needed.    If you  smoke, please stop smoking.  Discussed with patient the importance of f/u with their primary care provider. Urged to go to the ER for any worsening signs or symptoms.

## 2023-12-05 NOTE — PROGRESS NOTES
"Subjective:      Patient ID: Rafael Garcia is a 32 y.o. male.    Vitals:  height is 5' 10" (1.778 m) and weight is 74.8 kg (165 lb). His oral temperature is 97.8 °F (36.6 °C). His blood pressure is 129/70 and his pulse is 66. His respiration is 19 and oxygen saturation is 98%.     Chief Complaint: Sinus Problem    Pt is coming in today for sinus issues, cough and sore throat. Pt states he came in last week he tested positive for the flu. Pt states he just finished the tamiflu Sunday and finished po prednisone. Pt reports continuous cough and patient is flying out the country tomorrow. Denies fever.     Sinus Problem  This is a new problem. The current episode started 1 to 4 weeks ago. The problem is unchanged. There has been no fever. His pain is at a severity of 5/10. The pain is moderate. Associated symptoms include congestion, coughing, ear pain, sinus pressure and a sore throat. Pertinent negatives include no headaches. Treatments tried: prescribed meds and alca seltzer. The treatment provided no relief.       HENT:  Positive for ear pain, congestion, sinus pressure and sore throat.    Respiratory:  Positive for cough.    Neurological:  Negative for headaches.      Objective:     Physical Exam   Constitutional: He is oriented to person, place, and time. He appears well-developed. He is cooperative.  Non-toxic appearance. He does not appear ill. No distress.   HENT:   Head: Normocephalic and atraumatic.   Ears:   Right Ear: Hearing, external ear and ear canal normal. Tympanic membrane is injected. A middle ear effusion is present.   Left Ear: Hearing, external ear and ear canal normal. Tympanic membrane is injected. A middle ear effusion is present.   Nose: Congestion present. No mucosal edema, rhinorrhea or nasal deformity. No epistaxis. Right sinus exhibits no maxillary sinus tenderness and no frontal sinus tenderness. Left sinus exhibits no maxillary sinus tenderness and no frontal sinus tenderness. "   Mouth/Throat: Uvula is midline, oropharynx is clear and moist and mucous membranes are normal. No trismus in the jaw. Normal dentition. No uvula swelling. No oropharyngeal exudate, posterior oropharyngeal edema or posterior oropharyngeal erythema.   Eyes: Conjunctivae and lids are normal. No scleral icterus.   Neck: Trachea normal and phonation normal. Neck supple. No edema present. No erythema present. No neck rigidity present.   Cardiovascular: Normal rate and regular rhythm.   Pulmonary/Chest: Effort normal and breath sounds normal. No respiratory distress. He has no decreased breath sounds. He has no wheezes. He has no rhonchi.         Comments: +dry cough. Lung sounds clear on auscultation.     Abdominal: Normal appearance.   Musculoskeletal: Normal range of motion.         General: No deformity. Normal range of motion.   Neurological: He is alert and oriented to person, place, and time. He exhibits normal muscle tone. Coordination normal.   Skin: Skin is warm, dry, intact, not diaphoretic and not pale.   Psychiatric: His speech is normal and behavior is normal. Judgment and thought content normal.   Nursing note and vitals reviewed.      Assessment:     1. Acute bronchitis, unspecified organism    2. Non-recurrent acute suppurative otitis media of both ears without spontaneous rupture of tympanic membranes        Plan:       Acute bronchitis, unspecified organism  -     dexAMETHasone injection 8 mg  -     promethazine-dextromethorphan (PROMETHAZINE-DM) 6.25-15 mg/5 mL Syrp; Take 5 mLs by mouth every 4 (four) hours as needed (for cough).  Dispense: 180 mL; Refill: 0    Non-recurrent acute suppurative otitis media of both ears without spontaneous rupture of tympanic membranes  -     azithromycin (ZITHROMAX) 500 MG tablet; Take 1 tablet (500 mg total) by mouth once daily. for 5 days  Dispense: 5 tablet; Refill: 0    VS stable.  Please drink plenty of fluids.  Please get plenty of rest.  Please return here or go  to the Emergency Department for any concerns or worsening of condition.  If you were prescribed antibiotics, please take them to completion.  If you do not have Hypertension or any history of palpitations, it is ok to take over the counter Sudafed or Mucinex D or Allegra-D or Claritin-D or Zyrtec-D.  If you do take one of the above, it is ok to combine that with plain over the counter Mucinex or Allegra or Claritin or Zyrtec.  If for example you are taking Zyrtec -D, you can combine that with Mucinex, but not Mucinex-D.  If you are taking Mucinex-D, you can combine that with plain Allegra or Claritin or Zyrtec.   If you do have Hypertension or palpitations, it is safe to take Coricidin HBP for relief of sinus symptoms.  If not allergic, please take over the counter Tylenol (Acetaminophen) and/or Motrin (Ibuprofen) as directed for control of pain and/or fever.  Please follow up with your primary care doctor or specialist as needed.    If you  smoke, please stop smoking.  Discussed with patient the importance of f/u with their primary care provider. Urged to go to the ER for any worsening signs or symptoms.